# Patient Record
Sex: MALE | Race: WHITE | HISPANIC OR LATINO | Employment: FULL TIME | URBAN - METROPOLITAN AREA
[De-identification: names, ages, dates, MRNs, and addresses within clinical notes are randomized per-mention and may not be internally consistent; named-entity substitution may affect disease eponyms.]

---

## 2020-02-26 ENCOUNTER — RESULTS ENCOUNTER (OUTPATIENT)
Dept: FAMILY MEDICINE CLINIC | Facility: CLINIC | Age: 55
End: 2020-02-26

## 2020-02-26 ENCOUNTER — OFFICE VISIT (OUTPATIENT)
Dept: FAMILY MEDICINE CLINIC | Facility: CLINIC | Age: 55
End: 2020-02-26

## 2020-02-26 VITALS
HEIGHT: 69 IN | WEIGHT: 164.6 LBS | DIASTOLIC BLOOD PRESSURE: 80 MMHG | HEART RATE: 58 BPM | RESPIRATION RATE: 16 BRPM | OXYGEN SATURATION: 98 % | SYSTOLIC BLOOD PRESSURE: 120 MMHG | TEMPERATURE: 98.1 F | BODY MASS INDEX: 24.38 KG/M2

## 2020-02-26 DIAGNOSIS — R06.02 SHORTNESS OF BREATH: Primary | ICD-10-CM

## 2020-02-26 DIAGNOSIS — Z71.6 ENCOUNTER FOR SMOKING CESSATION COUNSELING: ICD-10-CM

## 2020-02-26 DIAGNOSIS — Z12.11 SCREENING FOR COLON CANCER: ICD-10-CM

## 2020-02-26 DIAGNOSIS — F17.219 CIGARETTE NICOTINE DEPENDENCE WITH NICOTINE-INDUCED DISORDER: ICD-10-CM

## 2020-02-26 DIAGNOSIS — Z11.59 NEED FOR HEPATITIS C SCREENING TEST: ICD-10-CM

## 2020-02-26 DIAGNOSIS — G47.30 SLEEP APNEA, UNSPECIFIED TYPE: ICD-10-CM

## 2020-02-26 DIAGNOSIS — J40 BRONCHITIS: ICD-10-CM

## 2020-02-26 DIAGNOSIS — R22.41 NODULE OF SKIN OF RIGHT LOWER LEG: ICD-10-CM

## 2020-02-26 PROCEDURE — 99203 OFFICE O/P NEW LOW 30 MIN: CPT | Performed by: NURSE PRACTITIONER

## 2020-02-26 PROCEDURE — 99407 BEHAV CHNG SMOKING > 10 MIN: CPT | Performed by: NURSE PRACTITIONER

## 2020-02-26 RX ORDER — ALBUTEROL SULFATE 90 UG/1
1 AEROSOL, METERED RESPIRATORY (INHALATION)
COMMUNITY
Start: 2020-02-24 | End: 2021-02-02 | Stop reason: SDUPTHER

## 2020-02-26 RX ORDER — DOXYCYCLINE 100 MG/1
TABLET ORAL
COMMUNITY
Start: 2019-11-19 | End: 2020-02-26

## 2020-02-26 RX ORDER — BENZONATATE 100 MG/1
CAPSULE ORAL
COMMUNITY
Start: 2020-02-24 | End: 2021-02-02

## 2020-02-26 NOTE — PATIENT INSTRUCTIONS
SOA/Bronchitis/Coughing: will start on asmanex inhaler daily, continue albuterol inhaler prn, may use tessalon perles; schedule sleep study for at least 2 weeks out for re-eval of sleep apnea; smoking cessation education done with starting of chantix; more than 10 minutes spent on smoking cessation counseling; will have pt rto in 2 weeks for in house spirometry testing, out of office chest xray.    Right lower leg nodule: referring for U/S for further eval and to determine need for referral     Labs and cologaurd ordered today for further eval and screening.

## 2020-02-26 NOTE — PROGRESS NOTES
Subjective     Kevin Alonzo is a 54 y.o.. male.     Pt here today to become established.  Pt stating he went to [facility] on 2/24/20 for coughing and sweating at night; Influ A and B negative, RSS negative, dx with bronchitis and viral infection, rx tessalon perles and albuterol. Pt stating he has been coughing for about 2 weeks. Pt stating he has been having gabe. Chest discomfort for about 4-5 months. Pt stating  He gets short of breath at times too. Pt stating he is a 1 ppd smoker for past 7 years; Pt stating he did attempt to quit in December, used the patch for a couple weeks but then went back to smoking. Pt stating he gets bronchitis about twice a year. Pt stating he feels as if his coughing issues are getting worse and is worried about his breathing.    Pt stating she has a spot on his right leg for 5-6 years, does not hurt unless it gets hit on something; was smaller and has enlarged over time. The spot has gotten red in past but resolves, has had swelling around it at times but then it resolves.    Pt stating he is concerned with abd. Hernia to the upper part of abd. Pt stating for the past several years he has occassionally noticed a bulge to mid abd, but then it will go away. Pt stating it does not cause him pain, and does not cause any issues with his appetite. Pt denies any other abd. Issues, states he has bm daily without issues.    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     Pt stating he has a hx of sleep apnea. Pt states about 7 yrs ago he had a sleep study done, was informed he needed to use CPAP, he used it for a couple of years and then stopped. Pt stating he now sleeps about 5-6 hours a night and admits he does not feel well rested in morning.     Pt stating he was dx with beginning stages of glaucoma in December.      The following portions of the patient's history were reviewed and updated as appropriate: allergies, current medications, past family history, past medical history,  "past social history, past surgical history and problem list.    Past Medical History:   Diagnosis Date   • Bronchitis    • Glaucoma     December 2019 early stages   • Other follicular cysts of the skin and subcutaneous tissue     to post neck with removal   • Sleep apnea        History reviewed. No pertinent surgical history.    Review of Systems   Constitutional: Positive for appetite change.   Respiratory: Positive for cough and shortness of breath.    Skin:        nodule to right lower leg       No Known Allergies    Objective     Vitals:    02/26/20 0819   BP: 120/80   Pulse: 58   Resp: 16   Temp: 98.1 °F (36.7 °C)   TempSrc: Oral   SpO2: 98%   Weight: 74.7 kg (164 lb 9.6 oz)   Height: 175.3 cm (69\")     Body mass index is 24.31 kg/m².    Physical Exam   Constitutional: He is oriented to person, place, and time. He appears well-developed and well-nourished.   HENT:   Head: Normocephalic and atraumatic.   Eyes: Pupils are equal, round, and reactive to light.   Cardiovascular: Normal rate and regular rhythm.   Pulmonary/Chest: No accessory muscle usage. No respiratory distress. He has decreased breath sounds (left lower lobe clear/diminished). He has wheezes (fine) in the right upper field and the left upper field. He has no rhonchi. He has no rales.   Abdominal: Soft. Normal appearance and bowel sounds are normal. He exhibits no distension and no mass. There is no hepatosplenomegaly. There is no tenderness. There is no rigidity, no rebound and no guarding. No hernia.   Musculoskeletal: Normal range of motion.   Neurological: He is alert and oriented to person, place, and time.   Skin:   Right lower extremity: moderate sized hard moveable nodule noted   Vitals reviewed.        Current Outpatient Medications:   •  albuterol sulfate  (90 Base) MCG/ACT inhaler, INHALE 2 PUFFS QID, Disp: , Rfl:   •  benzonatate (TESSALON) 100 MG capsule, TK 1 TO 2 CS PO TID FOR 5 DAYS PRF COUGH, Disp: , Rfl:   •  mometasone " (ASMANEX TWISTHALER) inhaler 220 mcg/inhalation, Inhale 2 puffs Daily., Disp: 1 inhaler, Rfl: 0  •  varenicline (CHANTIX STARTING MONTH ABBY) 0.5 MG X 11 & 1 MG X 42 tablet, Take 0.5 mg one daily on days 1-3 and 0.5 mg twice daily on days 4-7. Then 1 mg twice daily for a total of 12 weeks (need to send request after one month, Disp: 53 tablet, Rfl: 0    Assessment/Plan   Kevin was seen today for spot on leg.    Diagnoses and all orders for this visit:    Shortness of breath  -     CBC & Differential  -     Comprehensive metabolic panel  -     Lipid panel  -     PSA Screen    Bronchitis  -     mometasone (ASMANEX TWISTHALER) inhaler 220 mcg/inhalation; Inhale 2 puffs Daily.    Sleep apnea, unspecified type  -     Ambulatory Referral to Sleep Medicine    Nodule of skin of right lower leg  -     US Nonvascular Extremity Limited; Future    Cigarette nicotine dependence with nicotine-induced disorder  -     varenicline (CHANTIX STARTING MONTH ABBY) 0.5 MG X 11 & 1 MG X 42 tablet; Take 0.5 mg one daily on days 1-3 and 0.5 mg twice daily on days 4-7. Then 1 mg twice daily for a total of 12 weeks (need to send request after one month    Need for hepatitis C screening test  -     Hepatitis C antibody    Screening for colon cancer  -     Cologuard - Stool, Per Rectum; Future    Encounter for smoking cessation counseling        Patient Instructions   SOA/Bronchitis/Coughing: will start on asmanex inhaler daily, continue albuterol inhaler prn, may use tessalon perles; schedule sleep study for at least 2 weeks out for re-eval of sleep apnea; smoking cessation education done with starting of chantix; more than 10 minutes spent on smoking cessation counseling; will have pt rto in 2 weeks for in house spirometry testing, out of office chest xray.    Right lower leg nodule: referring for U/S for further eval and to determine need for referral     Labs and cologaurd ordered today for further eval and screening.       Return for  return in 2 weeks for f/u, needs spirometry/vaccine update.

## 2020-02-27 LAB
ALBUMIN SERPL-MCNC: 4.4 G/DL (ref 3.5–5.2)
ALBUMIN/GLOB SERPL: 2 G/DL
ALP SERPL-CCNC: 70 U/L (ref 39–117)
ALT SERPL-CCNC: 13 U/L (ref 1–41)
AST SERPL-CCNC: 13 U/L (ref 1–40)
BASOPHILS # BLD AUTO: 0.05 10*3/MM3 (ref 0–0.2)
BASOPHILS NFR BLD AUTO: 0.6 % (ref 0–1.5)
BILIRUB SERPL-MCNC: 0.4 MG/DL (ref 0.2–1.2)
BUN SERPL-MCNC: 12 MG/DL (ref 6–20)
BUN/CREAT SERPL: 11.4 (ref 7–25)
CALCIUM SERPL-MCNC: 9.5 MG/DL (ref 8.6–10.5)
CHLORIDE SERPL-SCNC: 102 MMOL/L (ref 98–107)
CHOLEST SERPL-MCNC: 181 MG/DL (ref 0–200)
CO2 SERPL-SCNC: 28.8 MMOL/L (ref 22–29)
CREAT SERPL-MCNC: 1.05 MG/DL (ref 0.76–1.27)
EOSINOPHIL # BLD AUTO: 0.15 10*3/MM3 (ref 0–0.4)
EOSINOPHIL NFR BLD AUTO: 1.9 % (ref 0.3–6.2)
ERYTHROCYTE [DISTWIDTH] IN BLOOD BY AUTOMATED COUNT: 13 % (ref 12.3–15.4)
GLOBULIN SER CALC-MCNC: 2.2 GM/DL
GLUCOSE SERPL-MCNC: 98 MG/DL (ref 65–99)
HCT VFR BLD AUTO: 45.1 % (ref 37.5–51)
HCV AB S/CO SERPL IA: <0.1 S/CO RATIO (ref 0–0.9)
HDLC SERPL-MCNC: 66 MG/DL (ref 40–60)
HGB BLD-MCNC: 15.4 G/DL (ref 13–17.7)
IMM GRANULOCYTES # BLD AUTO: 0.03 10*3/MM3 (ref 0–0.05)
IMM GRANULOCYTES NFR BLD AUTO: 0.4 % (ref 0–0.5)
LDLC SERPL CALC-MCNC: 100 MG/DL (ref 0–100)
LYMPHOCYTES # BLD AUTO: 1.55 10*3/MM3 (ref 0.7–3.1)
LYMPHOCYTES NFR BLD AUTO: 19.5 % (ref 19.6–45.3)
MCH RBC QN AUTO: 29.7 PG (ref 26.6–33)
MCHC RBC AUTO-ENTMCNC: 34.1 G/DL (ref 31.5–35.7)
MCV RBC AUTO: 87.1 FL (ref 79–97)
MONOCYTES # BLD AUTO: 0.59 10*3/MM3 (ref 0.1–0.9)
MONOCYTES NFR BLD AUTO: 7.4 % (ref 5–12)
NEUTROPHILS # BLD AUTO: 5.58 10*3/MM3 (ref 1.7–7)
NEUTROPHILS NFR BLD AUTO: 70.2 % (ref 42.7–76)
NRBC BLD AUTO-RTO: 0 /100 WBC (ref 0–0.2)
PLATELET # BLD AUTO: 253 10*3/MM3 (ref 140–450)
POTASSIUM SERPL-SCNC: 5.6 MMOL/L (ref 3.5–5.2)
PROT SERPL-MCNC: 6.6 G/DL (ref 6–8.5)
PSA SERPL-MCNC: 2.24 NG/ML (ref 0–4)
RBC # BLD AUTO: 5.18 10*6/MM3 (ref 4.14–5.8)
SODIUM SERPL-SCNC: 142 MMOL/L (ref 136–145)
TRIGL SERPL-MCNC: 73 MG/DL (ref 0–150)
VLDLC SERPL CALC-MCNC: 14.6 MG/DL
WBC # BLD AUTO: 7.95 10*3/MM3 (ref 3.4–10.8)

## 2020-03-11 ENCOUNTER — HOSPITAL ENCOUNTER (OUTPATIENT)
Dept: GENERAL RADIOLOGY | Facility: HOSPITAL | Age: 55
Discharge: HOME | End: 2020-03-11
Admitting: NURSE PRACTITIONER

## 2020-03-11 ENCOUNTER — OFFICE VISIT (OUTPATIENT)
Dept: FAMILY MEDICINE CLINIC | Facility: CLINIC | Age: 55
End: 2020-03-11

## 2020-03-11 VITALS
OXYGEN SATURATION: 98 % | TEMPERATURE: 98 F | DIASTOLIC BLOOD PRESSURE: 74 MMHG | HEART RATE: 63 BPM | BODY MASS INDEX: 24.68 KG/M2 | WEIGHT: 166.6 LBS | HEIGHT: 69 IN | SYSTOLIC BLOOD PRESSURE: 118 MMHG

## 2020-03-11 DIAGNOSIS — F17.219 CIGARETTE NICOTINE DEPENDENCE WITH NICOTINE-INDUCED DISORDER: ICD-10-CM

## 2020-03-11 DIAGNOSIS — R05.9 COUGH: ICD-10-CM

## 2020-03-11 DIAGNOSIS — J40 BRONCHITIS: Primary | ICD-10-CM

## 2020-03-11 DIAGNOSIS — R22.41 NODULE OF SKIN OF RIGHT LOWER EXTREMITY: ICD-10-CM

## 2020-03-11 DIAGNOSIS — R06.00 DYSPNEA, UNSPECIFIED TYPE: ICD-10-CM

## 2020-03-11 DIAGNOSIS — Z71.6 ENCOUNTER FOR SMOKING CESSATION COUNSELING: ICD-10-CM

## 2020-03-11 PROCEDURE — 71046 X-RAY EXAM CHEST 2 VIEWS: CPT

## 2020-03-11 PROCEDURE — 99213 OFFICE O/P EST LOW 20 MIN: CPT | Performed by: NURSE PRACTITIONER

## 2020-03-11 RX ORDER — VARENICLINE TARTRATE 1 MG/1
1 TABLET, FILM COATED ORAL DAILY
Qty: 30 TABLET | Refills: 1 | Status: SHIPPED | OUTPATIENT
Start: 2020-03-11 | End: 2021-02-02

## 2020-03-11 NOTE — PATIENT INSTRUCTIONS
Smoking cessation: Further discussion, pt doing well on chantix, will send over month 2 and 3 script to pharmacy today, pt informed to start new script after completion of current chantix starter kit; discussed s/s of withdrawal of nicotine and to continue process of stopping smoking, discussed being patient with self.    SOA/cough/bronchitis: Discussed results of spirometry today, will continue to do another 2 weeks of asmanex; will have chest xray done today for further eval.     Right leg nodule: referral to general surgeon for removal and biopsy of nodule    Routine lab review: discussed all lab results, pt given copy of labs for home records    Sleep study: continue scheduling process

## 2020-03-11 NOTE — PROGRESS NOTES
"Subjective     Kevin Alonzo is a 54 y.o.. male.     Pt here today for f/u on breathing. Pt stating he is using the chantix and is doing much better. Pt stating he is having about 2-3 cigs a day. Pt stating his wife is still smoking. Pt stating he does feel a little jittery but doing well over all. Pt stating he still gets SOA and has some coughing.         The following portions of the patient's history were reviewed and updated as appropriate: allergies, current medications, past family history, past medical history, past social history, past surgical history and problem list.    Past Medical History:   Diagnosis Date   • Bronchitis    • Glaucoma     December 2019 early stages   • Other follicular cysts of the skin and subcutaneous tissue     to post neck with removal   • Sleep apnea        No past surgical history on file.    Review of Systems   Respiratory: Positive for cough and shortness of breath.        No Known Allergies    Objective     Vitals:    03/11/20 0802   BP: 118/74   Pulse: 63   Temp: 98 °F (36.7 °C)   SpO2: 98%   Weight: 75.6 kg (166 lb 9.6 oz)   Height: 175 cm (68.9\")     Body mass index is 24.68 kg/m².    Physical Exam   Constitutional: He is oriented to person, place, and time. He appears well-developed and well-nourished.   HENT:   Head: Normocephalic.   Eyes: Pupils are equal, round, and reactive to light.   Cardiovascular: Normal rate and regular rhythm.   Pulmonary/Chest: Effort normal. No accessory muscle usage or stridor. No respiratory distress. He has decreased breath sounds (clear/diminished through out). He has no wheezes. He has no rhonchi. He has no rales.   Musculoskeletal: Normal range of motion.   Neurological: He is alert and oriented to person, place, and time.   Vitals reviewed.        Current Outpatient Medications:   •  albuterol sulfate  (90 Base) MCG/ACT inhaler, INHALE 2 PUFFS QID, Disp: , Rfl:   •  benzonatate (TESSALON) 100 MG capsule, TK 1 TO 2 CS PO TID " FOR 5 DAYS PRF COUGH, Disp: , Rfl:   •  mometasone (ASMANEX TWISTHALER) inhaler 220 mcg/inhalation, Inhale 2 puffs Daily., Disp: 1 inhaler, Rfl: 0  •  varenicline (CHANTIX CONTINUING MONTH ABBY) 1 MG tablet, Take 1 tablet by mouth Daily., Disp: 30 tablet, Rfl: 1  •  varenicline (CHANTIX STARTING MONTH ABBY) 0.5 MG X 11 & 1 MG X 42 tablet, Take 0.5 mg one daily on days 1-3 and 0.5 mg twice daily on days 4-7. Then 1 mg twice daily for a total of 12 weeks (need to send request after one month, Disp: 53 tablet, Rfl: 0    In house spirometry: FVC 91.42%, FEV 1 73.06%, FEV 1/FVC 79.91%  Chest xray 3/11/20: The lungs are normal in volume and clear. Heart size normal. The cardiomediastinal silhouette is normal in appearance. Chest wall is within normal limits.    Assessment/Plan   Kevin was seen today for shortness of breath.    Diagnoses and all orders for this visit:    Bronchitis    Dyspnea, unspecified type  -     Breathing Capacity Test; Future  -     XR Chest PA & Lateral; Future    Cough  -     Breathing Capacity Test; Future  -     XR Chest PA & Lateral; Future    Nodule of skin of right lower extremity  -     Ambulatory Referral to General Surgery    Encounter for smoking cessation counseling    Cigarette nicotine dependence with nicotine-induced disorder  -     varenicline (CHANTIX CONTINUING MONTH ABBY) 1 MG tablet; Take 1 tablet by mouth Daily.        Patient Instructions   Smoking cessation: Further discussion, pt doing well on chantix, will send over month 2 and 3 script to pharmacy today, pt informed to start new script after completion of current chantix starter kit; discussed s/s of withdrawal of nicotine and to continue process of stopping smoking, discussed being patient with self.    SOA/cough/bronchitis: Discussed results of spirometry today, will continue to do another 2 weeks of asmanex; will have chest xray done today for further eval.     Right leg nodule: referral to general surgeon for removal  and biopsy of nodule    Routine lab review: discussed all lab results, pt given copy of labs for home records    Sleep study: continue scheduling process      Return for follow up in 2-3 months for f/u on breathing/smoking cessation.

## 2020-03-16 ENCOUNTER — OFFICE VISIT (OUTPATIENT)
Dept: SURGERY | Facility: CLINIC | Age: 55
End: 2020-03-16

## 2020-03-16 VITALS — HEART RATE: 69 BPM | BODY MASS INDEX: 23.48 KG/M2 | HEIGHT: 70 IN | WEIGHT: 164 LBS | OXYGEN SATURATION: 97 %

## 2020-03-16 DIAGNOSIS — M79.89 SOFT TISSUE MASS: ICD-10-CM

## 2020-03-16 DIAGNOSIS — L72.3 SEBACEOUS CYST: Primary | ICD-10-CM

## 2020-03-16 PROCEDURE — 88307 TISSUE EXAM BY PATHOLOGIST: CPT | Performed by: SURGERY

## 2020-03-16 PROCEDURE — 12031 INTMD RPR S/A/T/EXT 2.5 CM/<: CPT | Performed by: SURGERY

## 2020-03-16 PROCEDURE — 88341 IMHCHEM/IMCYTCHM EA ADD ANTB: CPT | Performed by: SURGERY

## 2020-03-16 PROCEDURE — 11402 EXC TR-EXT B9+MARG 1.1-2 CM: CPT | Performed by: SURGERY

## 2020-03-16 PROCEDURE — 88360 TUMOR IMMUNOHISTOCHEM/MANUAL: CPT | Performed by: SURGERY

## 2020-03-16 PROCEDURE — 88342 IMHCHEM/IMCYTCHM 1ST ANTB: CPT | Performed by: SURGERY

## 2020-03-16 NOTE — PROGRESS NOTES
Chief complaint: Subcutaneous soft tissue mass right lower extremity    History of presenting illness:  54-year-old gentleman with a cystic lesion on the medial aspect of his lower leg present for several years with some increasing pain and discomfort.  There is been no drainage and no radiation of this discomfort.  It has slowly grown, but nothing significant.    Past medical history: Glaucoma, tobacco abuse    Past surgical history: Negative    Medications: Albuterol, Chantix    Allergies: None known    Physical exam:  Body mass index 23.5  General: Awake alert and oriented, no distress  Eyes: Extraocular movements are intact, no icterus  Neck: Supple, trachea midline  Respiratory: No use of accessory muscles, good bilateral chest expansion  Gastrointestinal: Abdomen is soft benign, no tenderness  Extremities: On the medial aspect of his right calf there is an approximately 2.5 cm mobile cystic lesion which is tender.  There is no overlying cellulitis or skin change.    Assessment and plan:  -Subcutaneous mass right lower extremity, growing and increasingly tender  -I suspect that this reflects some sort of a cystic lesion I have recommended excision  Please see procedure note below      Procedure note:    Preoperative diagnosis: Subcutaneous mass right lower extremity    Postoperative diagnosis: Same    Procedure performed: Excision subcutaneous mass, size approximately 2.5 x 2.5 cm    Surgeon: Davion Tijerina MD    Anesthesia: Local    Estimated blood loss: Less than 1 cc    Specimens: Subcutaneous mass right lower extremity    Complications: None    Description of procedure:  After obtaining informed consent, the patient's leg was sterilely prepped and draped.  The area around this lesion was infiltrated with a mixture of lidocaine and Marcaine.  Longitudinal incision was then made directly over this mass and I dissected through the skin onto the mass itself.  It was fairly superficial.  There were extensive,  but flimsy adhesions to the subcutaneous tissues which were taken sharply with scissors.  I was able to dissect around the mass completely and get into the deep subcutaneous tissues and  from these underlying tissues with minimal difficulty with a combination of blunt and sharp dissection.  The mass was excised completely and sent off for pathologic evaluation.  Total size was approximately 2.5 x 2.5 cm.  The underlying tissues were then irrigated.  I then closed the deep layer of subcutaneous tissue with interrupted 3-0 Vicryl sutures.  Skin was then closed with a running 4-0 nylon suture.  Sterile dressings were applied.  The patient tolerated this well.    Davion Tijerina MD  General and Endoscopic Surgery  [facility]    [address], [address]  [place], [place], [address]  P: [phone]  F: [phone]

## 2020-03-24 ENCOUNTER — TELEPHONE (OUTPATIENT)
Dept: FAMILY MEDICINE CLINIC | Facility: CLINIC | Age: 55
End: 2020-03-24

## 2020-03-24 LAB
CYTO UR: NORMAL
DX PRELIMINARY: NORMAL
LAB AP CASE REPORT: NORMAL
LAB AP CLINICAL INFORMATION: NORMAL
LAB AP DIAGNOSIS COMMENT: NORMAL
LAB AP SPECIAL STAINS: NORMAL
PATH REPORT.FINAL DX SPEC: NORMAL
PATH REPORT.GROSS SPEC: NORMAL

## 2020-04-02 ENCOUNTER — OFFICE VISIT (OUTPATIENT)
Dept: SURGERY | Facility: CLINIC | Age: 55
End: 2020-04-02

## 2020-04-02 DIAGNOSIS — M79.89 SOFT TISSUE MASS: Primary | ICD-10-CM

## 2020-04-02 PROCEDURE — 99212 OFFICE O/P EST SF 10 MIN: CPT | Performed by: SURGERY

## 2020-04-02 NOTE — PROGRESS NOTES
Follow-up excision of lesion from right lower extremity    Subjective:  No complaints.  No pain or discomfort at the site.  He has had no drainage from the site.    Objective:  Incision on the right lower extremity is healing nicely with no sign of infection.  Sutures removed today.  There is no surrounding cellulitis and no edema of the lower extremity.    Pathology reviewed and discussed with patient.  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    Assessment and plan:  -Status post excision soft tissue mass right lower extremity  -Pathology is unusual but benign  -No further treatment needed  -Sutures were removed today and Steri-Strips were placed  -He may follow-up on an as-needed basis    Davion Tijerina MD  General and Endoscopic Surgery  [facility]    [address], [address]  [place], [place], [address]  P: [phone]  F: [phone]

## 2021-02-02 ENCOUNTER — OFFICE VISIT (OUTPATIENT)
Dept: FAMILY MEDICINE CLINIC | Facility: CLINIC | Age: 56
End: 2021-02-02

## 2021-02-02 DIAGNOSIS — R05.9 COUGH: ICD-10-CM

## 2021-02-02 DIAGNOSIS — J01.90 ACUTE SINUSITIS, RECURRENCE NOT SPECIFIED, UNSPECIFIED LOCATION: Primary | ICD-10-CM

## 2021-02-02 PROCEDURE — 99442 PR PHYS/QHP TELEPHONE EVALUATION 11-20 MIN: CPT | Performed by: NURSE PRACTITIONER

## 2021-02-02 RX ORDER — AMOXICILLIN AND CLAVULANATE POTASSIUM 875; 125 MG/1; MG/1
1 TABLET, FILM COATED ORAL 2 TIMES DAILY
Qty: 20 TABLET | Refills: 0 | Status: SHIPPED | OUTPATIENT
Start: 2021-02-02 | End: 2021-02-12

## 2021-02-02 RX ORDER — METHYLPREDNISOLONE 4 MG/1
TABLET ORAL
Qty: 1 EACH | Refills: 0 | Status: SHIPPED | OUTPATIENT
Start: 2021-02-02 | End: 2022-11-28

## 2021-02-02 RX ORDER — ALBUTEROL SULFATE 90 UG/1
2 AEROSOL, METERED RESPIRATORY (INHALATION) EVERY 4 HOURS PRN
Qty: 106 G | Refills: 0 | Status: SHIPPED | OUTPATIENT
Start: 2021-02-02 | End: 2022-11-28

## 2021-02-02 RX ORDER — BENZONATATE 100 MG/1
100 CAPSULE ORAL 3 TIMES DAILY PRN
Qty: 30 CAPSULE | Refills: 0 | Status: SHIPPED | OUTPATIENT
Start: 2021-02-02 | End: 2022-11-28

## 2021-02-02 NOTE — PATIENT INSTRUCTIONS
Drink plenty of fluids, rest, and warm salt water gargles twice a day, if coughing worsens and/or SOA should call so that chest xray order can be placed and done for further eval. Recommend Covid19 testing; pt declined Covid19 testing at this time. Pt informed if he changes his mind to call and can schedule appt. Pt verb. Understanding.

## 2021-02-02 NOTE — PROGRESS NOTES
Subjective     Kevin Alonzo is a 55 y.o.. male.     This was an audio enabled telemedicine encounter.  You have chosen to receive care through a telephone visit. Do you consent to use a telephone visit for your medical care today? yes    Cough  This is a new problem. Episode onset: 2 days. The problem has been unchanged. The cough is productive of sputum. Associated symptoms include headaches (this am), postnasal drip, rhinorrhea and a sore throat. Pertinent negatives include no ear pain, fever or shortness of breath. Treatments tried: mucinex. The treatment provided mild relief.       The following portions of the patient's history were reviewed and updated as appropriate: allergies, current medications, past family history, past medical history, past social history, past surgical history and problem list.    Past Medical History:   Diagnosis Date   • Bronchitis    • Glaucoma     December 2019 early stages   • Other follicular cysts of the skin and subcutaneous tissue     to post neck with removal   • Sleep apnea        Past Surgical History:   Procedure Laterality Date   • MASS EXCISION Right 03/16/2020    In-office Procedure: Excision of right medial calf soft tissue mass-Dr. Davion Tijerina, [facility]       Review of Systems   Constitutional: Positive for fatigue. Negative for fever.   HENT: Positive for congestion, postnasal drip, rhinorrhea, sinus pressure and sore throat. Negative for ear pain.    Respiratory: Positive for cough (productive). Negative for shortness of breath.    Cardiovascular: Negative.    Gastrointestinal: Positive for diarrhea. Negative for nausea and vomiting.   Genitourinary: Negative.    Musculoskeletal: Negative.    Neurological: Positive for headaches (this am).       No Known Allergies    Objective     There were no vitals filed for this visit.; telephone visit  There is no height or weight on file to calculate BMI.; telephone visit    Physical Exam; telephone visit      Current  Outpatient Medications:   •  albuterol sulfate  (90 Base) MCG/ACT inhaler, Inhale 2 puffs Every 4 (Four) Hours As Needed for Wheezing (coughing episodes)., Disp: 106 g, Rfl: 0  •  amoxicillin-clavulanate (Augmentin) 875-125 MG per tablet, Take 1 tablet by mouth 2 (Two) Times a Day for 10 days., Disp: 20 tablet, Rfl: 0  •  benzonatate (Tessalon Perles) 100 MG capsule, Take 1 capsule by mouth 3 (Three) Times a Day As Needed for Cough., Disp: 30 capsule, Rfl: 0  •  methylPREDNISolone (MEDROL) 4 MG dose pack, Take as directed on package instructions., Disp: 1 each, Rfl: 0    Time: 20 minutes    Assessment/Plan   Diagnoses and all orders for this visit:    1. Acute sinusitis, recurrence not specified, unspecified location (Primary)  -     amoxicillin-clavulanate (Augmentin) 875-125 MG per tablet; Take 1 tablet by mouth 2 (Two) Times a Day for 10 days.  Dispense: 20 tablet; Refill: 0  -     methylPREDNISolone (MEDROL) 4 MG dose pack; Take as directed on package instructions.  Dispense: 1 each; Refill: 0    2. Cough  -     benzonatate (Tessalon Perles) 100 MG capsule; Take 1 capsule by mouth 3 (Three) Times a Day As Needed for Cough.  Dispense: 30 capsule; Refill: 0  -     albuterol sulfate  (90 Base) MCG/ACT inhaler; Inhale 2 puffs Every 4 (Four) Hours As Needed for Wheezing (coughing episodes).  Dispense: 106 g; Refill: 0         Patient Instructions   Drink plenty of fluids, rest, and warm salt water gargles twice a day, if coughing worsens and/or SOA should call so that chest xray order can be placed and done for further eval. Recommend Covid19 testing; pt declined Covid19 testing at this time. Pt informed if he changes his mind to call and can schedule appt. Pt verb. Understanding.           Return if symptoms worsen or fail to improve.

## 2022-11-28 ENCOUNTER — OFFICE VISIT (OUTPATIENT)
Dept: FAMILY MEDICINE CLINIC | Facility: CLINIC | Age: 57
End: 2022-11-28

## 2022-11-28 VITALS — TEMPERATURE: 101 F

## 2022-11-28 DIAGNOSIS — R06.2 WHEEZING: ICD-10-CM

## 2022-11-28 DIAGNOSIS — R05.1 ACUTE COUGH: ICD-10-CM

## 2022-11-28 DIAGNOSIS — U07.1 COVID-19 VIRUS INFECTION: Primary | ICD-10-CM

## 2022-11-28 PROCEDURE — 99213 OFFICE O/P EST LOW 20 MIN: CPT | Performed by: NURSE PRACTITIONER

## 2022-11-28 RX ORDER — PREDNISONE 20 MG/1
20 TABLET ORAL 2 TIMES DAILY
Qty: 8 TABLET | Refills: 0 | Status: SHIPPED | OUTPATIENT
Start: 2022-11-28 | End: 2022-12-02

## 2022-11-28 RX ORDER — BENZONATATE 100 MG/1
100 CAPSULE ORAL 3 TIMES DAILY PRN
Qty: 30 CAPSULE | Refills: 0 | Status: SHIPPED | OUTPATIENT
Start: 2022-11-28 | End: 2022-12-06

## 2022-11-28 RX ORDER — ALBUTEROL SULFATE 90 UG/1
2 AEROSOL, METERED RESPIRATORY (INHALATION) EVERY 4 HOURS PRN
Qty: 18 G | Refills: 0 | Status: SHIPPED | OUTPATIENT
Start: 2022-11-28 | End: 2022-12-06 | Stop reason: SDUPTHER

## 2022-11-28 NOTE — PROGRESS NOTES
Subjective     Kevin Alonzo is a 57 y.o.. male.     Mode of Visit: Video  Location of patient: home  [place] [facility]  You have chosen to receive care through a telephone visit. Do you consent to use a telephone visit for your medical care today? Yes    Pt stating he tested positive for covid today, 11/28/22; Symptoms started 4 days ago.    Cough  This is a new problem. Episode onset: 4 days. The problem has been unchanged. The cough is non-productive. Associated symptoms include a fever (101 this am), headaches, rhinorrhea and a sore throat. Pertinent negatives include no ear pain or postnasal drip.   Fever   Associated symptoms include coughing, diarrhea, headaches and a sore throat. Pertinent negatives include no congestion, ear pain, nausea or vomiting.       The following portions of the patient's history were reviewed and updated as appropriate: allergies, current medications, past family history, past medical history, past social history, past surgical history and problem list.    Past Medical History:   Diagnosis Date   • Bronchitis    • Glaucoma     December 2019 early stages   • Other follicular cysts of the skin and subcutaneous tissue     to post neck with removal   • Sleep apnea        Past Surgical History:   Procedure Laterality Date   • MASS EXCISION Right 03/16/2020    In-office Procedure: Excision of right medial calf soft tissue mass-Dr. Davion Tijerina, [facility]       Review of Systems   Constitutional: Positive for fever (101 this am).   HENT: Positive for rhinorrhea and sore throat. Negative for congestion, ear pain and postnasal drip.    Respiratory: Positive for cough.    Gastrointestinal: Positive for diarrhea. Negative for nausea and vomiting.   Neurological: Positive for headaches.       No Known Allergies    Objective     Vitals:    11/28/22 0938   Temp: (!) 101 °F (38.3 °C)     There is no height or weight on file to calculate BMI.        Current Outpatient  Medications:   •  albuterol sulfate  (90 Base) MCG/ACT inhaler, Inhale 2 puffs Every 4 (Four) Hours As Needed for Wheezing or Shortness of Air (coughing episodes)., Disp: 18 g, Rfl: 0  •  benzonatate (Tessalon Perles) 100 MG capsule, Take 1 capsule by mouth 3 (Three) Times a Day As Needed for Cough., Disp: 30 capsule, Rfl: 0  •  predniSONE (DELTASONE) 20 MG tablet, Take 1 tablet by mouth 2 (Two) Times a Day for 4 days., Disp: 8 tablet, Rfl: 0      Diagnoses and all orders for this visit:    1. COVID-19 virus infection (Primary)  -     albuterol sulfate  (90 Base) MCG/ACT inhaler; Inhale 2 puffs Every 4 (Four) Hours As Needed for Wheezing or Shortness of Air (coughing episodes).  Dispense: 18 g; Refill: 0    2. Wheezing  -     albuterol sulfate  (90 Base) MCG/ACT inhaler; Inhale 2 puffs Every 4 (Four) Hours As Needed for Wheezing or Shortness of Air (coughing episodes).  Dispense: 18 g; Refill: 0    3. Acute cough  -     predniSONE (DELTASONE) 20 MG tablet; Take 1 tablet by mouth 2 (Two) Times a Day for 4 days.  Dispense: 8 tablet; Refill: 0  -     benzonatate (Tessalon Perles) 100 MG capsule; Take 1 capsule by mouth 3 (Three) Times a Day As Needed for Cough.  Dispense: 30 capsule; Refill: 0  -     albuterol sulfate  (90 Base) MCG/ACT inhaler; Inhale 2 puffs Every 4 (Four) Hours As Needed for Wheezing or Shortness of Air (coughing episodes).  Dispense: 18 g; Refill: 0        Patient Instructions   Drink plenty of fluids-water preferably, eat a heart healthy diet, get plenty of sleep and do warm salt water gargles twice a day until feeling better; pt to stay in quarantine for 5 days and if having any symptoms on day 5 should stay in quarantine for another 5 days to equal 10 days.       Return if symptoms worsen or fail to improve.

## 2022-11-28 NOTE — PATIENT INSTRUCTIONS
Drink plenty of fluids-water preferably, eat a heart healthy diet, get plenty of sleep and do warm salt water gargles twice a day until feeling better; pt to stay in quarantine for 5 days and if having any symptoms on day 5 should stay in quarantine for another 5 days to equal 10 days.

## 2022-12-03 PROCEDURE — 99284 EMERGENCY DEPT VISIT MOD MDM: CPT

## 2022-12-04 ENCOUNTER — HOSPITAL ENCOUNTER (EMERGENCY)
Facility: HOSPITAL | Age: 57
Discharge: HOME | End: 2022-12-04
Attending: EMERGENCY MEDICINE | Admitting: EMERGENCY MEDICINE

## 2022-12-04 ENCOUNTER — APPOINTMENT (OUTPATIENT)
Dept: GENERAL RADIOLOGY | Facility: HOSPITAL | Age: 57
End: 2022-12-04

## 2022-12-04 VITALS
HEIGHT: 70 IN | TEMPERATURE: 98.2 F | WEIGHT: 165 LBS | SYSTOLIC BLOOD PRESSURE: 103 MMHG | BODY MASS INDEX: 23.62 KG/M2 | RESPIRATION RATE: 16 BRPM | HEART RATE: 59 BPM | DIASTOLIC BLOOD PRESSURE: 68 MMHG | OXYGEN SATURATION: 96 %

## 2022-12-04 DIAGNOSIS — U07.1 COVID: Primary | ICD-10-CM

## 2022-12-04 DIAGNOSIS — R05.1 ACUTE COUGH: ICD-10-CM

## 2022-12-04 LAB
ALBUMIN SERPL-MCNC: 3.7 G/DL (ref 3.5–5.2)
ALBUMIN/GLOB SERPL: 1.5 G/DL
ALP SERPL-CCNC: 78 U/L (ref 39–117)
ALT SERPL W P-5'-P-CCNC: 25 U/L (ref 1–41)
ANION GAP SERPL CALCULATED.3IONS-SCNC: 12 MMOL/L (ref 5–15)
AST SERPL-CCNC: 19 U/L (ref 1–40)
B PARAPERT DNA SPEC QL NAA+PROBE: NOT DETECTED
B PERT DNA SPEC QL NAA+PROBE: NOT DETECTED
BASOPHILS # BLD AUTO: 0.03 10*3/MM3 (ref 0–0.2)
BASOPHILS NFR BLD AUTO: 0.3 % (ref 0–1.5)
BILIRUB SERPL-MCNC: <0.2 MG/DL (ref 0–1.2)
BUN SERPL-MCNC: 24 MG/DL (ref 6–20)
BUN/CREAT SERPL: 28.2 (ref 7–25)
C PNEUM DNA NPH QL NAA+NON-PROBE: NOT DETECTED
CALCIUM SPEC-SCNC: 9.3 MG/DL (ref 8.6–10.5)
CHLORIDE SERPL-SCNC: 104 MMOL/L (ref 98–107)
CO2 SERPL-SCNC: 26 MMOL/L (ref 22–29)
CREAT SERPL-MCNC: 0.85 MG/DL (ref 0.76–1.27)
D DIMER PPP FEU-MCNC: 0.32 MCGFEU/ML (ref 0–0.57)
DEPRECATED RDW RBC AUTO: 39.7 FL (ref 37–54)
EGFRCR SERPLBLD CKD-EPI 2021: 101.4 ML/MIN/1.73
EOSINOPHIL # BLD AUTO: 0.09 10*3/MM3 (ref 0–0.4)
EOSINOPHIL NFR BLD AUTO: 0.8 % (ref 0.3–6.2)
ERYTHROCYTE [DISTWIDTH] IN BLOOD BY AUTOMATED COUNT: 12.8 % (ref 12.3–15.4)
FLUAV SUBTYP SPEC NAA+PROBE: NOT DETECTED
FLUBV RNA ISLT QL NAA+PROBE: NOT DETECTED
GLOBULIN UR ELPH-MCNC: 2.4 GM/DL
GLUCOSE SERPL-MCNC: 94 MG/DL (ref 65–99)
HADV DNA SPEC NAA+PROBE: NOT DETECTED
HCOV 229E RNA SPEC QL NAA+PROBE: NOT DETECTED
HCOV HKU1 RNA SPEC QL NAA+PROBE: NOT DETECTED
HCOV NL63 RNA SPEC QL NAA+PROBE: NOT DETECTED
HCOV OC43 RNA SPEC QL NAA+PROBE: NOT DETECTED
HCT VFR BLD AUTO: 43.5 % (ref 37.5–51)
HGB BLD-MCNC: 14.9 G/DL (ref 13–17.7)
HMPV RNA NPH QL NAA+NON-PROBE: NOT DETECTED
HPIV1 RNA ISLT QL NAA+PROBE: NOT DETECTED
HPIV2 RNA SPEC QL NAA+PROBE: NOT DETECTED
HPIV3 RNA NPH QL NAA+PROBE: NOT DETECTED
HPIV4 P GENE NPH QL NAA+PROBE: NOT DETECTED
IMM GRANULOCYTES # BLD AUTO: 0.03 10*3/MM3 (ref 0–0.05)
IMM GRANULOCYTES NFR BLD AUTO: 0.3 % (ref 0–0.5)
LYMPHOCYTES # BLD AUTO: 2.59 10*3/MM3 (ref 0.7–3.1)
LYMPHOCYTES NFR BLD AUTO: 21.7 % (ref 19.6–45.3)
M PNEUMO IGG SER IA-ACNC: NOT DETECTED
MCH RBC QN AUTO: 29.4 PG (ref 26.6–33)
MCHC RBC AUTO-ENTMCNC: 34.3 G/DL (ref 31.5–35.7)
MCV RBC AUTO: 85.8 FL (ref 79–97)
MONOCYTES # BLD AUTO: 0.88 10*3/MM3 (ref 0.1–0.9)
MONOCYTES NFR BLD AUTO: 7.4 % (ref 5–12)
NEUTROPHILS NFR BLD AUTO: 69.5 % (ref 42.7–76)
NEUTROPHILS NFR BLD AUTO: 8.32 10*3/MM3 (ref 1.7–7)
NRBC BLD AUTO-RTO: 0 /100 WBC (ref 0–0.2)
PLATELET # BLD AUTO: 236 10*3/MM3 (ref 140–450)
PMV BLD AUTO: 9.5 FL (ref 6–12)
POTASSIUM SERPL-SCNC: 4.5 MMOL/L (ref 3.5–5.2)
PROT SERPL-MCNC: 6.1 G/DL (ref 6–8.5)
QT INTERVAL: 392 MS
RBC # BLD AUTO: 5.07 10*6/MM3 (ref 4.14–5.8)
RHINOVIRUS RNA SPEC NAA+PROBE: NOT DETECTED
RSV RNA NPH QL NAA+NON-PROBE: NOT DETECTED
SARS-COV-2 RNA NPH QL NAA+NON-PROBE: DETECTED
SODIUM SERPL-SCNC: 142 MMOL/L (ref 136–145)
TROPONIN T SERPL-MCNC: <0.01 NG/ML (ref 0–0.03)
TROPONIN T SERPL-MCNC: <0.01 NG/ML (ref 0–0.03)
WBC NRBC COR # BLD: 11.94 10*3/MM3 (ref 3.4–10.8)

## 2022-12-04 PROCEDURE — 36415 COLL VENOUS BLD VENIPUNCTURE: CPT

## 2022-12-04 PROCEDURE — 85025 COMPLETE CBC W/AUTO DIFF WBC: CPT | Performed by: EMERGENCY MEDICINE

## 2022-12-04 PROCEDURE — 71046 X-RAY EXAM CHEST 2 VIEWS: CPT

## 2022-12-04 PROCEDURE — 85379 FIBRIN DEGRADATION QUANT: CPT | Performed by: EMERGENCY MEDICINE

## 2022-12-04 PROCEDURE — 93005 ELECTROCARDIOGRAM TRACING: CPT | Performed by: EMERGENCY MEDICINE

## 2022-12-04 PROCEDURE — 93010 ELECTROCARDIOGRAM REPORT: CPT | Performed by: INTERNAL MEDICINE

## 2022-12-04 PROCEDURE — 94640 AIRWAY INHALATION TREATMENT: CPT

## 2022-12-04 PROCEDURE — 84484 ASSAY OF TROPONIN QUANT: CPT | Performed by: EMERGENCY MEDICINE

## 2022-12-04 PROCEDURE — 94799 UNLISTED PULMONARY SVC/PX: CPT

## 2022-12-04 PROCEDURE — 0202U NFCT DS 22 TRGT SARS-COV-2: CPT | Performed by: EMERGENCY MEDICINE

## 2022-12-04 PROCEDURE — 80053 COMPREHEN METABOLIC PANEL: CPT | Performed by: EMERGENCY MEDICINE

## 2022-12-04 RX ORDER — GUAIFENESIN 600 MG/1
1200 TABLET, EXTENDED RELEASE ORAL 2 TIMES DAILY
Qty: 28 TABLET | Refills: 0 | Status: SHIPPED | OUTPATIENT
Start: 2022-12-04 | End: 2022-12-11

## 2022-12-04 RX ORDER — SODIUM CHLORIDE 0.9 % (FLUSH) 0.9 %
10 SYRINGE (ML) INJECTION AS NEEDED
Status: DISCONTINUED | OUTPATIENT
Start: 2022-12-04 | End: 2022-12-04 | Stop reason: HOSPADM

## 2022-12-04 RX ORDER — IPRATROPIUM BROMIDE AND ALBUTEROL SULFATE 2.5; .5 MG/3ML; MG/3ML
3 SOLUTION RESPIRATORY (INHALATION) ONCE
Status: COMPLETED | OUTPATIENT
Start: 2022-12-04 | End: 2022-12-04

## 2022-12-04 RX ADMIN — IPRATROPIUM BROMIDE AND ALBUTEROL SULFATE 3 ML: 2.5; .5 SOLUTION RESPIRATORY (INHALATION) at 04:02

## 2022-12-04 NOTE — ED PROVIDER NOTES
EMERGENCY DEPARTMENT ENCOUNTER    Room Number:  13/13  Date of encounter:  12/4/2022  PCP: Bernadine Mejia APRN  Historian: Patient      HPI:  Chief Complaint: Dyspnea  A complete HPI/ROS/PMH/PSH/SH/FH are unobtainable due to: None    Context: Kevin Alonzo is a 57 y.o. male who presents to the ED c/o cough.  Patient was diagnosed with COVID 6 days ago.  Will take his medication.  Has had an episode of dyspnea at home which has resolved.  States he felt like he could not breathe had some sharp pain in his chest.  Reports cough has been minimally productive.  Episode of shortness of breath has resolved.  Patient is now baseline.      PAST MEDICAL HISTORY  Active Ambulatory Problems     Diagnosis Date Noted   • Cigarette nicotine dependence with nicotine-induced disorder 03/11/2020   • Encounter for smoking cessation counseling 03/11/2020   • Nodule of skin of right lower extremity 03/11/2020   • Cough 03/11/2020   • Dyspnea 03/11/2020   • Bronchitis 03/11/2020     Resolved Ambulatory Problems     Diagnosis Date Noted   • No Resolved Ambulatory Problems     Past Medical History:   Diagnosis Date   • Glaucoma    • Other follicular cysts of the skin and subcutaneous tissue    • Sleep apnea          PAST SURGICAL HISTORY  Past Surgical History:   Procedure Laterality Date   • MASS EXCISION Right 03/16/2020    In-office Procedure: Excision of right medial calf soft tissue mass-Dr. Davion Tijerina, [facility]         FAMILY HISTORY  Family History   Problem Relation Age of Onset   • Skin cancer Father    • Leukemia Sister          SOCIAL HISTORY  Social History     Socioeconomic History   • Marital status:    Tobacco Use   • Smoking status: Every Day     Packs/day: 0.25     Years: 12.00     Pack years: 3.00     Types: Cigarettes     Start date: 2008   • Smokeless tobacco: Never   Substance and Sexual Activity   • Alcohol use: Not Currently   • Drug use: Defer   • Sexual activity: Defer         ALLERGIES  Patient  has no known allergies.        REVIEW OF SYSTEMS  Review of Systems     All systems reviewed and negative except for those discussed in HPI.       PHYSICAL EXAM    I have reviewed the triage vital signs and nursing notes.    ED Triage Vitals [12/03/22 2336]   Temp Heart Rate Resp BP SpO2   98.2 °F (36.8 °C) 76 18 108/82 98 %      Temp src Heart Rate Source Patient Position BP Location FiO2 (%)   Oral Monitor Lying Right arm --       Physical Exam  GENERAL: not distressed  HENT: nares patent  EYES: no scleral icterus  CV: regular rhythm, regular rate  RESPIRATORY: normal effort, clear to station bilaterally  ABDOMEN: soft nontender nondistended  MUSCULOSKELETAL: no deformity  NEURO: alert, moves all extremities, follows commands  SKIN: warm, dry        LAB RESULTS  Recent Results (from the past 24 hour(s))   Respiratory Panel PCR w/COVID-19(SARS-CoV-2) ANGELA/FERNANDA/ALEX/PAD/COR/MAD/MARGARITO In-House, NP Swab in UTM/VTM, 3-4 HR TAT - Swab, Nasopharynx    Collection Time: 12/04/22  2:51 AM    Specimen: Nasopharynx; Swab   Result Value Ref Range    ADENOVIRUS, PCR Not Detected Not Detected    Coronavirus 229E Not Detected Not Detected    Coronavirus HKU1 Not Detected Not Detected    Coronavirus NL63 Not Detected Not Detected    Coronavirus OC43 Not Detected Not Detected    COVID19 Detected (C) Not Detected - Ref. Range    Human Metapneumovirus Not Detected Not Detected    Human Rhinovirus/Enterovirus Not Detected Not Detected    Influenza A PCR Not Detected Not Detected    Influenza B PCR Not Detected Not Detected    Parainfluenza Virus 1 Not Detected Not Detected    Parainfluenza Virus 2 Not Detected Not Detected    Parainfluenza Virus 3 Not Detected Not Detected    Parainfluenza Virus 4 Not Detected Not Detected    RSV, PCR Not Detected Not Detected    Bordetella pertussis pcr Not Detected Not Detected    Bordetella parapertussis PCR Not Detected Not Detected    Chlamydophila pneumoniae PCR Not Detected Not Detected     Mycoplasma pneumo by PCR Not Detected Not Detected   Comprehensive Metabolic Panel    Collection Time: 12/04/22  2:51 AM    Specimen: Blood   Result Value Ref Range    Glucose 94 65 - 99 mg/dL    BUN 24 (H) 6 - 20 mg/dL    Creatinine 0.85 0.76 - 1.27 mg/dL    Sodium 142 136 - 145 mmol/L    Potassium 4.5 3.5 - 5.2 mmol/L    Chloride 104 98 - 107 mmol/L    CO2 26.0 22.0 - 29.0 mmol/L    Calcium 9.3 8.6 - 10.5 mg/dL    Total Protein 6.1 6.0 - 8.5 g/dL    Albumin 3.70 3.50 - 5.20 g/dL    ALT (SGPT) 25 1 - 41 U/L    AST (SGOT) 19 1 - 40 U/L    Alkaline Phosphatase 78 39 - 117 U/L    Total Bilirubin <0.2 0.0 - 1.2 mg/dL    Globulin 2.4 gm/dL    A/G Ratio 1.5 g/dL    BUN/Creatinine Ratio 28.2 (H) 7.0 - 25.0    Anion Gap 12.0 5.0 - 15.0 mmol/L    eGFR 101.4 >60.0 mL/min/1.73   D-dimer, Quantitative    Collection Time: 12/04/22  2:51 AM    Specimen: Blood   Result Value Ref Range    D-Dimer, Quantitative 0.32 0.00 - 0.57 MCGFEU/mL   Troponin    Collection Time: 12/04/22  2:51 AM    Specimen: Blood   Result Value Ref Range    Troponin T <0.010 0.000 - 0.030 ng/mL   CBC Auto Differential    Collection Time: 12/04/22  2:51 AM    Specimen: Blood   Result Value Ref Range    WBC 11.94 (H) 3.40 - 10.80 10*3/mm3    RBC 5.07 4.14 - 5.80 10*6/mm3    Hemoglobin 14.9 13.0 - 17.7 g/dL    Hematocrit 43.5 37.5 - 51.0 %    MCV 85.8 79.0 - 97.0 fL    MCH 29.4 26.6 - 33.0 pg    MCHC 34.3 31.5 - 35.7 g/dL    RDW 12.8 12.3 - 15.4 %    RDW-SD 39.7 37.0 - 54.0 fl    MPV 9.5 6.0 - 12.0 fL    Platelets 236 140 - 450 10*3/mm3    Neutrophil % 69.5 42.7 - 76.0 %    Lymphocyte % 21.7 19.6 - 45.3 %    Monocyte % 7.4 5.0 - 12.0 %    Eosinophil % 0.8 0.3 - 6.2 %    Basophil % 0.3 0.0 - 1.5 %    Immature Grans % 0.3 0.0 - 0.5 %    Neutrophils, Absolute 8.32 (H) 1.70 - 7.00 10*3/mm3    Lymphocytes, Absolute 2.59 0.70 - 3.10 10*3/mm3    Monocytes, Absolute 0.88 0.10 - 0.90 10*3/mm3    Eosinophils, Absolute 0.09 0.00 - 0.40 10*3/mm3    Basophils, Absolute  0.03 0.00 - 0.20 10*3/mm3    Immature Grans, Absolute 0.03 0.00 - 0.05 10*3/mm3    nRBC 0.0 0.0 - 0.2 /100 WBC   ECG 12 Lead Dyspnea    Collection Time: 12/04/22  2:52 AM   Result Value Ref Range    QT Interval 392 ms   Troponin    Collection Time: 12/04/22  4:55 AM    Specimen: Blood   Result Value Ref Range    Troponin T <0.010 0.000 - 0.030 ng/mL       Ordered the above labs and independently reviewed the results.        RADIOLOGY  XR Chest 2 View    Result Date: 12/4/2022  Patient: DEBBIE RICHARDSON  Time Out: 03:13 Exam(s): FILM CXR 2 VIEWS EXAM:   XR Chest, 2 Views CLINICAL HISTORY:    Reason for exam: cough. TECHNIQUE:   Frontal and lateral views of the chest. COMPARISON:   3 11 2020. FINDINGS:   Lungs:  No consolidative change.   Pleural space:  No pleural effusions.  No pneumothorax.   Heart:  Unremarkable.  No cardiomegaly.   Mediastinum:  Cardiomediastinal silhouette unremarkable.   Bones joints:  Mild degenerative disc disease of the thoracic spine and dextroscoliosis. IMPRESSION:       No active disease, similar to that noted on the previous study.     Electronically signed by Baltazar Hooker MD on 12-04-22 at 0313      I ordered the above noted radiological studies. Reviewed by me and discussed with radiologist.  See dictation for official radiology interpretation.      PROCEDURES    Procedures      MEDICATIONS GIVEN IN ER    Medications   sodium chloride 0.9 % flush 10 mL (has no administration in time range)   ipratropium-albuterol (DUO-NEB) nebulizer solution 3 mL (3 mL Nebulization Given 12/4/22 0402)         PROGRESS, DATA ANALYSIS, CONSULTS, AND MEDICAL DECISION MAKING    All labs have been independently reviewed by me.  All radiology studies have been reviewed by me and discussed with radiologist dictating the report.   EKG's independently viewed and interpreted by me.  Discussion below represents my analysis of pertinent findings related to patient's condition, differential diagnosis,  treatment plan and final disposition.        ED Course as of 12/04/22 0608   Sun Dec 04, 2022   0254 EKG          EKG time: 0252  Rhythm/Rate: Sinus rhythm rate 65  P waves and [place]: Within normal limits  QRS, axis: Narrow regular  ST and T waves: Normal    Interpreted Contemporaneously by me, independently viewed [TJ]   0404 D-Dimer, Quant: 0.32 [TJ]   0606 Reevaluation: Patient is feeling better.  Work-up negative.  Will discharge home. [TJ]      ED Course User Index  [TJ] Kelton Ellis MD           PPE: The patient wore a surgical mask throughout the entire patient encounter. I wore an N95.    AS OF 06:08 EST VITALS:    BP - 103/72  HR - 57  TEMP - 98.2 °F (36.8 °C) (Oral)  O2 SATS - 98%        DIAGNOSIS  Final diagnoses:   COVID   Acute cough         DISPOSITION  Discharge           Kelton Ellis MD  12/04/22 0608

## 2022-12-04 NOTE — ED NOTES
"Pt arrived from home via [facility] c/o cough. Pt was Covid+ 6 days ago and states his cough got \"so bad he felt like he couldn't breath\" Pt has a nonproductive, dry frequent cough in triage but does not appear to be in distress.   "

## 2022-12-06 ENCOUNTER — OFFICE VISIT (OUTPATIENT)
Dept: FAMILY MEDICINE CLINIC | Facility: CLINIC | Age: 57
End: 2022-12-06

## 2022-12-06 VITALS
SYSTOLIC BLOOD PRESSURE: 122 MMHG | DIASTOLIC BLOOD PRESSURE: 78 MMHG | TEMPERATURE: 98.2 F | HEIGHT: 70 IN | RESPIRATION RATE: 18 BRPM | HEART RATE: 85 BPM | OXYGEN SATURATION: 98 % | BODY MASS INDEX: 23.68 KG/M2

## 2022-12-06 DIAGNOSIS — R09.81 NASAL CONGESTION: ICD-10-CM

## 2022-12-06 DIAGNOSIS — U07.1 COVID-19 VIRUS INFECTION: Primary | ICD-10-CM

## 2022-12-06 DIAGNOSIS — R53.83 FATIGUE, UNSPECIFIED TYPE: ICD-10-CM

## 2022-12-06 DIAGNOSIS — R51.9 ACUTE NONINTRACTABLE HEADACHE, UNSPECIFIED HEADACHE TYPE: ICD-10-CM

## 2022-12-06 DIAGNOSIS — R05.1 ACUTE COUGH: ICD-10-CM

## 2022-12-06 PROCEDURE — 99213 OFFICE O/P EST LOW 20 MIN: CPT | Performed by: NURSE PRACTITIONER

## 2022-12-06 RX ORDER — AZELASTINE 1 MG/ML
1 SPRAY, METERED NASAL 2 TIMES DAILY
Qty: 1 EACH | Refills: 0 | Status: SHIPPED | OUTPATIENT
Start: 2022-12-06 | End: 2022-12-20

## 2022-12-06 RX ORDER — DEXTROMETHORPHAN HYDROBROMIDE AND PROMETHAZINE HYDROCHLORIDE 15; 6.25 MG/5ML; MG/5ML
5 SYRUP ORAL 3 TIMES DAILY PRN
Qty: 150 ML | Refills: 0 | Status: SHIPPED | OUTPATIENT
Start: 2022-12-06

## 2022-12-06 RX ORDER — PREDNISONE 20 MG/1
20 TABLET ORAL 2 TIMES DAILY
Qty: 6 TABLET | Refills: 0 | Status: SHIPPED | OUTPATIENT
Start: 2022-12-06 | End: 2022-12-09

## 2022-12-06 RX ORDER — ALBUTEROL SULFATE 90 UG/1
2 AEROSOL, METERED RESPIRATORY (INHALATION) EVERY 4 HOURS PRN
Qty: 18 G | Refills: 0 | Status: SHIPPED | OUTPATIENT
Start: 2022-12-06

## 2022-12-06 NOTE — PROGRESS NOTES
Subjective   Kevin Alonzo is a 57 y.o. male. Patient is here today for   Chief Complaint   Patient presents with   • Cough     Slight greenish color sometimes thick milky   • Nasal Congestion   • Headache     Post covid he went to ER 12/4/22            Vitals:    12/06/22 1505   BP: 122/78   Pulse: 85   Resp: 18   Temp: 98.2 °F (36.8 °C)   SpO2: 98%     The following portions of the patient's history were reviewed and updated as appropriate: allergies, current medications, past family history, past medical history, past social history, past surgical history and problem list.    Past Medical History:   Diagnosis Date   • Bronchitis    • Glaucoma     December 2019 early stages   • Other follicular cysts of the skin and subcutaneous tissue     to post neck with removal   • Sleep apnea       No Known Allergies   Social History     Socioeconomic History   • Marital status:    Tobacco Use   • Smoking status: Every Day     Packs/day: 0.25     Years: 12.00     Pack years: 3.00     Types: Cigarettes     Start date: 2008   • Smokeless tobacco: Never   Substance and Sexual Activity   • Alcohol use: Not Currently   • Drug use: Defer   • Sexual activity: Defer        Current Outpatient Medications:   •  albuterol sulfate  (90 Base) MCG/ACT inhaler, Inhale 2 puffs Every 4 (Four) Hours As Needed for Wheezing or Shortness of Air (coughing episodes)., Disp: 18 g, Rfl: 0  •  guaiFENesin (MUCINEX) 600 MG 12 hr tablet, Take 2 tablets by mouth 2 (Two) Times a Day for 7 days., Disp: 28 tablet, Rfl: 0  •  azelastine (ASTELIN) 0.1 % nasal spray, 1 spray into the nostril(s) as directed by provider 2 (Two) Times a Day for 14 days. Use in each nostril as directed, Disp: 1 each, Rfl: 0  •  predniSONE (DELTASONE) 20 MG tablet, Take 1 tablet by mouth 2 (Two) Times a Day for 3 days., Disp: 6 tablet, Rfl: 0  •  promethazine-dextromethorphan (PROMETHAZINE-DM) 6.25-15 MG/5ML syrup, Take 5 mL by mouth 3 (Three) Times a Day As  Needed for Cough., Disp: 150 mL, Rfl: 0     Objective     History of Present Illness  Pt here today for hospital follow up from [facility] on 12/4/22 for c/o cough. Patient positive for covid on 11/28/22 and started with symptoms 4 days prior to that. pt had an episode of dyspnea at home before going to ER.  Pt described feeling like he could not breathe had some sharp pain in his chest.  Pt c/o cough with PND. Chest xray showed no consolidative change and/or Pleural space, No pleural effusions, No pneumothorax. Heart: Unremarkable.  No cardiomegaly.  Cardiomediastinal silhouette unremarkable. Bones joints:  Mild degenerative disc disease of the thoracic spine and dextroscoliosis. IMPRESSION:  No active disease, similar to that noted on the previous study. Pt was given IV fluids and duo neb while in ER. Pt having an EKG while in ER which showed Sinus rhythm rate 65, P waves and MI: Within normal limits, QRS, axis: Narrow regular, ST and T waves: Normal.       Review of Systems   Constitutional: Positive for fatigue.   HENT: Positive for postnasal drip, rhinorrhea and sore throat. Negative for congestion and ear pain.    Respiratory: Positive for cough and chest tightness.    Gastrointestinal: Positive for diarrhea (minor). Negative for nausea and vomiting.   Neurological: Positive for headaches. Negative for dizziness.       Physical Exam  Vitals reviewed.   Constitutional:       Appearance: He is well-developed.   HENT:      Head: Normocephalic and atraumatic.      Right Ear: Tympanic membrane normal. No middle ear effusion. Tympanic membrane is not erythematous.      Left Ear: Tympanic membrane normal.  No middle ear effusion. Tympanic membrane is not erythematous.      Nose: Congestion present.      Mouth/Throat:      Mouth: Mucous membranes are moist.      Pharynx: Oropharyngeal exudate (clear pnd) and posterior oropharyngeal erythema (slight) present. No pharyngeal swelling.   Eyes:      Conjunctiva/sclera:  Conjunctivae normal.      Pupils: Pupils are equal, round, and reactive to light.   Cardiovascular:      Rate and Rhythm: Normal rate and regular rhythm.      Heart sounds: No murmur heard.  Pulmonary:      Effort: Pulmonary effort is normal. No accessory muscle usage or respiratory distress.      Breath sounds: No stridor. No decreased breath sounds, wheezing, rhonchi or rales.   Musculoskeletal:         General: Normal range of motion.   Lymphadenopathy:      Cervical: No cervical adenopathy.   Skin:     General: Skin is warm and dry.   Neurological:      Mental Status: He is alert and oriented to person, place, and time.         ASSESSMENT     Problems Addressed this Visit        Pulmonary and Pneumonias    Cough    Relevant Medications    predniSONE (DELTASONE) 20 MG tablet    promethazine-dextromethorphan (PROMETHAZINE-DM) 6.25-15 MG/5ML syrup    albuterol sulfate  (90 Base) MCG/ACT inhaler   Other Visit Diagnoses     COVID-19 virus infection    -  Primary    Relevant Medications    albuterol sulfate  (90 Base) MCG/ACT inhaler    Nasal congestion        Relevant Medications    azelastine (ASTELIN) 0.1 % nasal spray    Fatigue, unspecified type        Acute nonintractable headache, unspecified headache type          Diagnoses       Codes Comments    COVID-19 virus infection    -  Primary ICD-10-CM: U07.1  ICD-9-CM: 079.89     Acute cough     ICD-10-CM: R05.1  ICD-9-CM: 786.2     Nasal congestion     ICD-10-CM: R09.81  ICD-9-CM: 478.19     Fatigue, unspecified type     ICD-10-CM: R53.83  ICD-9-CM: 780.79     Acute nonintractable headache, unspecified headache type     ICD-10-CM: R51.9  ICD-9-CM: 784.0           Current outpatient and discharge medications have been reconciled for the patient.  Reviewed by: LOLLY Sanders      PLAN    Patient Instructions   Drink plenty of fluids-water preferably, eat a heart healthy diet, get plenty of sleep and do warm salt water gargles twice a day until  feeling better; pt informed to stay in quarantine until Thursday, if still having symptoms after that should wear mask and take it easy to help with recovery. Pt verb. Understanding.     Return if symptoms worsen or fail to improve.

## 2022-12-06 NOTE — PATIENT INSTRUCTIONS
Drink plenty of fluids-water preferably, eat a heart healthy diet, get plenty of sleep and do warm salt water gargles twice a day until feeling better; pt informed to stay in quarantine until Thursday, if still having symptoms after that should wear mask and take it easy to help with recovery. Pt verb. Understanding.